# Patient Record
Sex: FEMALE | Race: WHITE | NOT HISPANIC OR LATINO | Employment: UNEMPLOYED | URBAN - METROPOLITAN AREA
[De-identification: names, ages, dates, MRNs, and addresses within clinical notes are randomized per-mention and may not be internally consistent; named-entity substitution may affect disease eponyms.]

---

## 2017-02-06 ENCOUNTER — EPISODE CHANGES (OUTPATIENT)
Dept: HEPATOLOGY | Facility: CLINIC | Age: 22
End: 2017-02-06

## 2017-02-09 ENCOUNTER — TELEPHONE (OUTPATIENT)
Dept: HEPATOLOGY | Facility: CLINIC | Age: 22
End: 2017-02-09

## 2017-02-09 ENCOUNTER — EPISODE CHANGES (OUTPATIENT)
Dept: HEPATOLOGY | Facility: CLINIC | Age: 22
End: 2017-02-09

## 2017-02-09 NOTE — TELEPHONE ENCOUNTER
Pt was due to have labs drawn on 2/6/17. Pt No Show. MA called pt today (2/9/17) to reschedule miss lab appt. Call successful. Spoke with DayanGaby.  informed staff that pt left her home and didn't leave contact information.

## 2017-06-28 ENCOUNTER — OFFICE VISIT (OUTPATIENT)
Dept: FAMILY MEDICINE | Facility: CLINIC | Age: 22
End: 2017-06-28
Payer: MEDICAID

## 2017-06-28 VITALS
RESPIRATION RATE: 16 BRPM | DIASTOLIC BLOOD PRESSURE: 70 MMHG | HEART RATE: 94 BPM | HEIGHT: 60 IN | TEMPERATURE: 98 F | SYSTOLIC BLOOD PRESSURE: 120 MMHG | OXYGEN SATURATION: 99 % | BODY MASS INDEX: 25.84 KG/M2 | WEIGHT: 131.63 LBS

## 2017-06-28 DIAGNOSIS — F41.9 ANXIETY: ICD-10-CM

## 2017-06-28 DIAGNOSIS — F90.9 ATTENTION DEFICIT HYPERACTIVITY DISORDER (ADHD), UNSPECIFIED ADHD TYPE: Primary | ICD-10-CM

## 2017-06-28 DIAGNOSIS — T78.2XXS ANAPHYLAXIS, SEQUELA: ICD-10-CM

## 2017-06-28 DIAGNOSIS — F90.9 ATTENTION DEFICIT HYPERACTIVITY DISORDER (ADHD), UNSPECIFIED ADHD TYPE: ICD-10-CM

## 2017-06-28 DIAGNOSIS — J30.9 ALLERGIC RHINITIS, UNSPECIFIED ALLERGIC RHINITIS TRIGGER, UNSPECIFIED RHINITIS SEASONALITY: ICD-10-CM

## 2017-06-28 DIAGNOSIS — M54.50 ACUTE BILATERAL LOW BACK PAIN WITHOUT SCIATICA: ICD-10-CM

## 2017-06-28 PROCEDURE — 99214 OFFICE O/P EST MOD 30 MIN: CPT | Mod: PBBFAC,PO | Performed by: PHYSICIAN ASSISTANT

## 2017-06-28 PROCEDURE — 99214 OFFICE O/P EST MOD 30 MIN: CPT | Mod: S$PBB,,, | Performed by: PHYSICIAN ASSISTANT

## 2017-06-28 PROCEDURE — 99999 PR PBB SHADOW E&M-EST. PATIENT-LVL IV: CPT | Mod: PBBFAC,,, | Performed by: PHYSICIAN ASSISTANT

## 2017-06-28 RX ORDER — FLUTICASONE PROPIONATE 50 MCG
2 SPRAY, SUSPENSION (ML) NASAL DAILY
Qty: 1 BOTTLE | Refills: 1 | Status: CANCELLED | OUTPATIENT
Start: 2017-06-28

## 2017-06-28 RX ORDER — EPINEPHRINE 0.3 MG/.3ML
1 INJECTION SUBCUTANEOUS ONCE
Qty: 0.3 ML | Refills: 1 | Status: CANCELLED | OUTPATIENT
Start: 2017-06-28 | End: 2017-06-28

## 2017-06-28 RX ORDER — EPINEPHRINE 0.3 MG/.3ML
1 INJECTION SUBCUTANEOUS ONCE
Qty: 2 EACH | Refills: 1 | Status: SHIPPED | OUTPATIENT
Start: 2017-06-28 | End: 2017-11-20 | Stop reason: SDUPTHER

## 2017-06-28 RX ORDER — FLUTICASONE PROPIONATE 50 MCG
2 SPRAY, SUSPENSION (ML) NASAL DAILY
Qty: 1 BOTTLE | Refills: 1 | Status: SHIPPED | OUTPATIENT
Start: 2017-06-28

## 2017-06-28 RX ORDER — BACLOFEN 10 MG/1
10 TABLET ORAL NIGHTLY PRN
Qty: 30 TABLET | Refills: 0 | Status: SHIPPED | OUTPATIENT
Start: 2017-06-28 | End: 2017-11-20

## 2017-06-28 NOTE — TELEPHONE ENCOUNTER
Saw her today and put in referral to see dragan. Wants to know if you will fill a month. Has not seen you seen sept but she was a reschedule today

## 2017-06-28 NOTE — PROGRESS NOTES
"Subjective:       Patient ID: Rigo Chi is a 22 y.o. female with multiple medical diagnoses as listed in the medical history and problem list that presents for Other (mental health referral)  .    Chief Complaint: Other (mental health referral)      Back Pain   This is a new problem. The current episode started today (feel this am when she was mopping --tripped ). The problem has been waxing and waning since onset. The pain is present in the gluteal. Quality: throbbing--pulsating  Radiates to: "all the way down to her feet" intermittently  Exacerbated by: walking  Associated symptoms include tingling. Pertinent negatives include no bladder incontinence, bowel incontinence, fever, numbness, paresis, paresthesias, perianal numbness or weakness. Treatments tried: tylenol and aleve around 1145. The treatment provided mild relief.     Pt wanting to get back on her ADHD medication.     Review of Systems   Constitutional: Negative for chills, fatigue and fever.   HENT: Negative for congestion.    Eyes: Negative for pain and discharge.   Respiratory: Negative for chest tightness and shortness of breath.    Gastrointestinal: Negative for bowel incontinence.   Genitourinary: Negative for bladder incontinence.   Musculoskeletal: Positive for back pain and neck pain. Negative for neck stiffness.   Neurological: Positive for tingling. Negative for facial asymmetry, weakness, numbness and paresthesias.   Psychiatric/Behavioral: Positive for decreased concentration. The patient is nervous/anxious.          PAST MEDICAL HISTORY:  Past Medical History:   Diagnosis Date    Allergy     History of hepatitis C: S/p RX with SVR 12 documented in 12/2016 12/5/2016       SOCIAL HISTORY:  Social History     Social History    Marital status: Single     Spouse name: N/A    Number of children: N/A    Years of education: N/A     Occupational History    Not on file.     Social History Main Topics    Smoking status: Current Every " Day Smoker    Smokeless tobacco: Never Used    Alcohol use No    Drug use: No    Sexual activity: Yes     Partners: Male     Birth control/ protection: None     Other Topics Concern    Not on file     Social History Narrative    No narrative on file       ALLERGIES AND MEDICATIONS: updated and reviewed.  Review of patient's allergies indicates:  No Known Allergies  Current Outpatient Prescriptions   Medication Sig Dispense Refill    epinephrine (EPIPEN 2-ANUEL) 0.3 mg/0.3 mL AtIn Inject 0.3 mLs (0.3 mg total) into the muscle once. 2 each 1    etonogestrel (NEXPLANON) 68 mg Impl by Subdermal route.      fluticasone (FLONASE) 50 mcg/actuation nasal spray 2 sprays by Each Nare route once daily. 1 Bottle 1    baclofen (LIORESAL) 10 MG tablet Take 1 tablet (10 mg total) by mouth nightly as needed. 30 tablet 0    dextroamphetamine-amphetamine 10 mg Tab Take 10 mg by mouth 2 (two) times daily. 60 tablet 0     No current facility-administered medications for this visit.          Objective:   /70   Pulse 94   Temp 98.4 °F (36.9 °C) (Oral)   Resp 16   Ht 5' (1.524 m)   Wt 59.7 kg (131 lb 9.8 oz)   LMP 06/25/2017   SpO2 99%   BMI 25.70 kg/m²      Physical Exam   Constitutional: She is oriented to person, place, and time. No distress.   HENT:   Head: Normocephalic and atraumatic.   Cardiovascular: Normal rate and regular rhythm.    Pulmonary/Chest: Effort normal and breath sounds normal.   Musculoskeletal:        Lumbar back: She exhibits tenderness and pain. She exhibits normal range of motion, no bony tenderness and no spasm.   Neurological: She is oriented to person, place, and time.   Skin: No erythema.   Psychiatric: Her speech is rapid and/or pressured.           Assessment:       1. Attention deficit hyperactivity disorder (ADHD), unspecified ADHD type    2. Anaphylaxis, sequela    3. Allergic rhinitis, unspecified allergic rhinitis trigger, unspecified rhinitis seasonality    4. Anxiety    5.  Acute bilateral low back pain without sciatica        Plan:       Attention deficit hyperactivity disorder (ADHD), unspecified ADHD type  -     Ambulatory referral to Psychiatry  Spoke to desk at Dr. Adriana Guzman and put in referral. Spoke with andreas who states she will give it to elbert and they will call her to so she can  referral in hand. Lady at office states pt can walk in with referral in hand any day 8-2:30 and be seen.     Anaphylaxis, sequela  -     epinephrine (EPIPEN 2-ANUEL) 0.3 mg/0.3 mL AtIn; Inject 0.3 mLs (0.3 mg total) into the muscle once.  Dispense: 2 each; Refill: 1    Allergic rhinitis, unspecified allergic rhinitis trigger, unspecified rhinitis seasonality  -     fluticasone (FLONASE) 50 mcg/actuation nasal spray; 2 sprays by Each Nare route once daily.  Dispense: 1 Bottle; Refill: 1    Anxiety  -     Ambulatory referral to Psychiatry    Acute bilateral low back pain without sciatica   NSAIDs  Ice  Rest  Baclofen at night           No Follow-up on file.

## 2017-06-28 NOTE — TELEPHONE ENCOUNTER
----- Message from Kalyn Hamilton sent at 6/28/2017  3:00 PM CDT -----  Contact: self   Pt would like medication to be sent to the pharmacy below.         [facility] [address], [place] [address] ·

## 2017-06-29 NOTE — TELEPHONE ENCOUNTER
Patient calling for  Refill of Adderall ; informed her the request will be sent to the doctor. LOV for Adderall 09/30/2016.

## 2017-06-29 NOTE — TELEPHONE ENCOUNTER
She can not have the medication, inconsistent follow up, will need a specialist to continue schedule II medication.

## 2017-06-29 NOTE — TELEPHONE ENCOUNTER
----- Message from Dafne Manriquez sent at 6/29/2017  9:57 AM CDT -----  Contact:  [phone]   Cynthia   Requesting a refill on adderall. Pls call Cynthia [phone]. Thanks.......Brenda

## 2017-07-02 RX ORDER — DEXTROAMPHETAMINE SACCHARATE, AMPHETAMINE ASPARTATE, DEXTROAMPHETAMINE SULFATE AND AMPHETAMINE SULFATE 2.5; 2.5; 2.5; 2.5 MG/1; MG/1; MG/1; MG/1
10 TABLET ORAL 2 TIMES DAILY
Qty: 60 TABLET | Refills: 0 | OUTPATIENT
Start: 2017-07-02

## 2017-07-06 ENCOUNTER — TELEPHONE (OUTPATIENT)
Dept: FAMILY MEDICINE | Facility: CLINIC | Age: 22
End: 2017-07-06

## 2017-07-06 DIAGNOSIS — F90.9 ATTENTION DEFICIT HYPERACTIVITY DISORDER (ADHD), UNSPECIFIED ADHD TYPE: ICD-10-CM

## 2017-07-06 DIAGNOSIS — F41.9 ANXIETY: Primary | ICD-10-CM

## 2017-07-06 NOTE — TELEPHONE ENCOUNTER
----- Message from Neetu Archer sent at 7/6/2017 10:37 AM CDT -----  Contact: Self/[phone]  Patient would like to speak to the staff regarding a Psychiatry referral. She would like the referral faxed to [phone].  Thank you.

## 2017-07-20 NOTE — TELEPHONE ENCOUNTER
Tried calling patient again, unable to leave voicemail. The referral has been authorized. I'm trying to have the patient come  the referral. Will try contacting her again.

## 2017-11-20 ENCOUNTER — OFFICE VISIT (OUTPATIENT)
Dept: FAMILY MEDICINE | Facility: CLINIC | Age: 22
End: 2017-11-20
Payer: MEDICAID

## 2017-11-20 VITALS
RESPIRATION RATE: 16 BRPM | BODY MASS INDEX: 26.4 KG/M2 | OXYGEN SATURATION: 99 % | HEIGHT: 59 IN | TEMPERATURE: 98 F | HEART RATE: 93 BPM | WEIGHT: 130.94 LBS

## 2017-11-20 DIAGNOSIS — R29.6 FALLS FREQUENTLY: ICD-10-CM

## 2017-11-20 DIAGNOSIS — R51.9 CHRONIC NONINTRACTABLE HEADACHE, UNSPECIFIED HEADACHE TYPE: ICD-10-CM

## 2017-11-20 DIAGNOSIS — T78.2XXS ANAPHYLAXIS, SEQUELA: ICD-10-CM

## 2017-11-20 DIAGNOSIS — F90.9 ATTENTION DEFICIT HYPERACTIVITY DISORDER (ADHD), UNSPECIFIED ADHD TYPE: Primary | ICD-10-CM

## 2017-11-20 DIAGNOSIS — G89.29 CHRONIC NONINTRACTABLE HEADACHE, UNSPECIFIED HEADACHE TYPE: ICD-10-CM

## 2017-11-20 PROCEDURE — 99999 PR PBB SHADOW E&M-EST. PATIENT-LVL III: CPT | Mod: PBBFAC,,, | Performed by: FAMILY MEDICINE

## 2017-11-20 PROCEDURE — 99213 OFFICE O/P EST LOW 20 MIN: CPT | Mod: PBBFAC,PO | Performed by: FAMILY MEDICINE

## 2017-11-20 PROCEDURE — 99395 PREV VISIT EST AGE 18-39: CPT | Mod: S$PBB,,, | Performed by: FAMILY MEDICINE

## 2017-11-20 RX ORDER — AMOXICILLIN 500 MG/1
500 CAPSULE ORAL EVERY 8 HOURS
COMMUNITY
End: 2018-02-01

## 2017-11-20 RX ORDER — EPINEPHRINE 0.3 MG/.3ML
1 INJECTION SUBCUTANEOUS ONCE
Qty: 2 EACH | Refills: 1 | Status: SHIPPED | OUTPATIENT
Start: 2017-11-20 | End: 2018-10-16 | Stop reason: SDUPTHER

## 2017-11-20 NOTE — PROGRESS NOTES
Chief Complaint   Patient presents with    [facility] referral       SUBJECTIVE:  Rigo Chi is a 22 y.o. female here for new problem of annual and wants to see psychiatry, won't divulge much and she has some headaches and wants to see neurology for headaches but again won't divulge much information.  Currently has co-morbidities including per problem list.      Past Medical History:   Diagnosis Date    Allergy     History of hepatitis C: S/p RX with SVR 12 documented in 2016     Past Surgical History:   Procedure Laterality Date     SECTION       Social History     Social History    Marital status: Single     Spouse name: N/A    Number of children: N/A    Years of education: N/A     Occupational History    Not on file.     Social History Main Topics    Smoking status: Current Every Day Smoker    Smokeless tobacco: Never Used    Alcohol use No    Drug use: No    Sexual activity: Yes     Partners: Male     Birth control/ protection: None     Other Topics Concern    Not on file     Social History Narrative    No narrative on file     Family History   Problem Relation Age of Onset    Mental illness Son      autism    Breast cancer Maternal Grandmother     Colon cancer Neg Hx     Ovarian cancer Neg Hx      Current Outpatient Prescriptions on File Prior to Visit   Medication Sig Dispense Refill    etonogestrel (NEXPLANON) 68 mg Impl by Subdermal route.      fluticasone (FLONASE) 50 mcg/actuation nasal spray 2 sprays by Each Nare route once daily. 1 Bottle 1    [DISCONTINUED] baclofen (LIORESAL) 10 MG tablet Take 1 tablet (10 mg total) by mouth nightly as needed. 30 tablet 0    [DISCONTINUED] dextroamphetamine-amphetamine 10 mg Tab Take 10 mg by mouth 2 (two) times daily. 60 tablet 0    [DISCONTINUED] epinephrine (EPIPEN 2-ANUEL) 0.3 mg/0.3 mL AtIn Inject 0.3 mLs (0.3 mg total) into the muscle once. 2 each 1     No current facility-administered medications on file prior to  "visit.      Review of patient's allergies indicates:  No Known Allergies      ROS    OBJECTIVE:  Pulse 93   Temp 97.9 °F (36.6 °C) (Oral)   Resp 16   Ht 4' 11" (1.499 m)   Wt 59.4 kg (130 lb 15.3 oz)   LMP 11/01/2017 (Approximate)   SpO2 99%   BMI 26.45 kg/m²     Wt Readings from Last 3 Encounters:   11/20/17 59.4 kg (130 lb 15.3 oz)   06/28/17 59.7 kg (131 lb 9.8 oz)   12/14/16 52.9 kg (116 lb 10 oz)     BP Readings from Last 3 Encounters:   06/28/17 120/70   12/14/16 114/80   09/30/16 118/70       She appears well, in no apparent distress.  Alert and oriented times three, pleasant and cooperative. Vital signs are as documented in vital signs section.  Poor eye contact   Speech is abnormal, doesn't appear intoxicating    Review of old Records:  Per problem list    Review of old labs:  No results found for: TSH  Lab Results   Component Value Date    WBC 6.05 04/29/2016    HGB 13.9 04/29/2016    HCT 40.9 04/29/2016    MCV 90 04/29/2016     04/29/2016       Chemistry        Component Value Date/Time     08/22/2016 1439    K 3.8 08/22/2016 1439     08/22/2016 1439    CO2 27 08/22/2016 1439    BUN 11 08/22/2016 1439    CREATININE 0.9 08/22/2016 1439     (H) 08/22/2016 1439        Component Value Date/Time    CALCIUM 9.5 08/22/2016 1439    ALKPHOS 98 08/22/2016 1439    AST 11 08/22/2016 1439    ALT 10 08/22/2016 1439    BILITOT 0.7 08/22/2016 1439    ESTGFRAFRICA >60 08/22/2016 1439    EGFRNONAA >60 08/22/2016 1439        Lab Results   Component Value Date    CHOL 110 (L) 04/02/2015     Lab Results   Component Value Date    HDL 44 04/02/2015     Lab Results   Component Value Date    LDLCALC 56.0 (L) 04/02/2015     Lab Results   Component Value Date    TRIG 50 04/02/2015     Lab Results   Component Value Date    CHOLHDL 40.0 04/02/2015         Review of old imaging:  n/a    ASSESSMENT:  Problem List Items Addressed This Visit     ADHD (attention deficit hyperactivity disorder) - Primary    " Relevant Orders    Ambulatory referral to Psychiatry      Other Visit Diagnoses     Anaphylaxis, sequela        Relevant Medications    EPINEPHrine (EPIPEN 2-ANUEL) 0.3 mg/0.3 mL AtIn    Chronic nonintractable headache, unspecified headache type        Relevant Orders    Ambulatory referral to Neurology    Falls frequently        Relevant Orders    Ambulatory referral to Neurology          ICD-10-CM ICD-9-CM   1. Attention deficit hyperactivity disorder (ADHD), unspecified ADHD type F90.9 314.01   2. Anaphylaxis, sequela T78.2XXS 909.9   3. Chronic nonintractable headache, unspecified headache type R51 784.0   4. Falls frequently R29.6 V15.88         PLAN:  1. Anaphylaxis, sequela  The current medical regimen is effective;  continue present plan and medications.    - EPINEPHrine (EPIPEN 2-ANUEL) 0.3 mg/0.3 mL AtIn; Inject 0.3 mLs (0.3 mg total) into the muscle once.  Dispense: 2 each; Refill: 1    2. Attention deficit hyperactivity disorder (ADHD), unspecified ADHD type  send  - Ambulatory referral to Psychiatry    3. Chronic nonintractable headache, unspecified headache type  Please send a another dose/package as soon as possible.    - Ambulatory referral to Neurology    4. Falls frequently  unclear  - Ambulatory referral to Neurology    She didn't give us much, maybe she will talk more to the specialist  Medication List with Changes/Refills   Current Medications    AMOXICILLIN (AMOXIL) 500 MG CAPSULE    Take 500 mg by mouth every 8 (eight) hours.    ETONOGESTREL (NEXPLANON) 68 MG IMPL    by Subdermal route.    FLUTICASONE (FLONASE) 50 MCG/ACTUATION NASAL SPRAY    2 sprays by Each Nare route once daily.   Changed and/or Refilled Medications    Modified Medication Previous Medication    EPINEPHRINE (EPIPEN 2-ANUEL) 0.3 MG/0.3 ML ATIN epinephrine (EPIPEN 2-ANUEL) 0.3 mg/0.3 mL AtIn       Inject 0.3 mLs (0.3 mg total) into the muscle once.    Inject 0.3 mLs (0.3 mg total) into the muscle once.   Discontinued Medications     BACLOFEN (LIORESAL) 10 MG TABLET    Take 1 tablet (10 mg total) by mouth nightly as needed.    DEXTROAMPHETAMINE-AMPHETAMINE 10 MG TAB    Take 10 mg by mouth 2 (two) times daily.       No Follow-up on file.

## 2017-11-22 ENCOUNTER — TELEPHONE (OUTPATIENT)
Dept: FAMILY MEDICINE | Facility: CLINIC | Age: 22
End: 2017-11-22

## 2017-11-22 NOTE — TELEPHONE ENCOUNTER
----- Message from Reymundo Arndt MD sent at 11/20/2017 10:45 PM CST -----  1 year [facility]

## 2017-11-29 ENCOUNTER — TELEPHONE (OUTPATIENT)
Dept: FAMILY MEDICINE | Facility: CLINIC | Age: 22
End: 2017-11-29

## 2017-12-07 ENCOUNTER — TELEPHONE (OUTPATIENT)
Dept: FAMILY MEDICINE | Facility: CLINIC | Age: 22
End: 2017-12-07

## 2017-12-07 NOTE — TELEPHONE ENCOUNTER
Left message for patient to call office.  Chart note 11/29/2017 -Referral to Neurology was faxed to [facility].  Please advise patient.

## 2017-12-07 NOTE — TELEPHONE ENCOUNTER
----- Message from Tabitha Sevilla sent at 12/7/2017  9:26 AM CST -----  Contact: Self  Pt wants to know if she can get a referral to Neurology. Pt can be reached @ [phone].

## 2017-12-26 ENCOUNTER — LAB VISIT (OUTPATIENT)
Dept: LAB | Facility: HOSPITAL | Age: 22
End: 2017-12-26
Attending: FAMILY MEDICINE
Payer: MEDICAID

## 2017-12-26 DIAGNOSIS — Z00.00 ANNUAL PHYSICAL EXAM: Primary | ICD-10-CM

## 2017-12-26 DIAGNOSIS — Z00.00 ANNUAL PHYSICAL EXAM: ICD-10-CM

## 2017-12-26 LAB
ALBUMIN SERPL BCP-MCNC: 4.1 G/DL
ALP SERPL-CCNC: 88 U/L
ALT SERPL W/O P-5'-P-CCNC: 6 U/L
ANION GAP SERPL CALC-SCNC: 6 MMOL/L
AST SERPL-CCNC: 12 U/L
BASOPHILS # BLD AUTO: 0.05 K/UL
BASOPHILS NFR BLD: 0.6 %
BILIRUB SERPL-MCNC: 0.7 MG/DL
BUN SERPL-MCNC: 9 MG/DL
C TRACH DNA SPEC QL NAA+PROBE: NOT DETECTED
CALCIUM SERPL-MCNC: 9.6 MG/DL
CHLORIDE SERPL-SCNC: 104 MMOL/L
CHOLEST SERPL-MCNC: 143 MG/DL
CHOLEST/HDLC SERPL: 2.8 {RATIO}
CO2 SERPL-SCNC: 28 MMOL/L
CREAT SERPL-MCNC: 0.8 MG/DL
DIFFERENTIAL METHOD: NORMAL
EOSINOPHIL # BLD AUTO: 0.5 K/UL
EOSINOPHIL NFR BLD: 5.1 %
ERYTHROCYTE [DISTWIDTH] IN BLOOD BY AUTOMATED COUNT: 13.1 %
EST. GFR  (AFRICAN AMERICAN): >60 ML/MIN/1.73 M^2
EST. GFR  (NON AFRICAN AMERICAN): >60 ML/MIN/1.73 M^2
ESTIMATED AVG GLUCOSE: 91 MG/DL
GLUCOSE SERPL-MCNC: 125 MG/DL
HBA1C MFR BLD HPLC: 4.8 %
HCT VFR BLD AUTO: 41.7 %
HDLC SERPL-MCNC: 52 MG/DL
HDLC SERPL: 36.4 %
HGB BLD-MCNC: 13.9 G/DL
LDLC SERPL CALC-MCNC: 74.8 MG/DL
LYMPHOCYTES # BLD AUTO: 3.2 K/UL
LYMPHOCYTES NFR BLD: 35.8 %
MCH RBC QN AUTO: 30.1 PG
MCHC RBC AUTO-ENTMCNC: 33.3 G/DL
MCV RBC AUTO: 90 FL
MONOCYTES # BLD AUTO: 0.5 K/UL
MONOCYTES NFR BLD: 5.2 %
N GONORRHOEA DNA SPEC QL NAA+PROBE: NOT DETECTED
NEUTROPHILS # BLD AUTO: 4.8 K/UL
NEUTROPHILS NFR BLD: 53.3 %
NONHDLC SERPL-MCNC: 91 MG/DL
PLATELET # BLD AUTO: 324 K/UL
PMV BLD AUTO: 9.7 FL
POTASSIUM SERPL-SCNC: 4.2 MMOL/L
PROT SERPL-MCNC: 7 G/DL
RBC # BLD AUTO: 4.62 M/UL
SODIUM SERPL-SCNC: 138 MMOL/L
TRIGL SERPL-MCNC: 81 MG/DL
TSH SERPL DL<=0.005 MIU/L-ACNC: 0.46 UIU/ML
URATE SERPL-MCNC: 4.2 MG/DL
WBC # BLD AUTO: 8.9 K/UL

## 2017-12-26 PROCEDURE — 86703 HIV-1/HIV-2 1 RESULT ANTBDY: CPT

## 2017-12-26 PROCEDURE — 86592 SYPHILIS TEST NON-TREP QUAL: CPT

## 2017-12-26 PROCEDURE — 83036 HEMOGLOBIN GLYCOSYLATED A1C: CPT

## 2017-12-26 PROCEDURE — 86803 HEPATITIS C AB TEST: CPT

## 2017-12-26 PROCEDURE — 36415 COLL VENOUS BLD VENIPUNCTURE: CPT | Mod: PO

## 2017-12-26 PROCEDURE — 84550 ASSAY OF BLOOD/URIC ACID: CPT

## 2017-12-26 PROCEDURE — 80053 COMPREHEN METABOLIC PANEL: CPT

## 2017-12-26 PROCEDURE — 85025 COMPLETE CBC W/AUTO DIFF WBC: CPT

## 2017-12-26 PROCEDURE — 87591 N.GONORRHOEAE DNA AMP PROB: CPT

## 2017-12-26 PROCEDURE — 80061 LIPID PANEL: CPT

## 2017-12-26 PROCEDURE — 84443 ASSAY THYROID STIM HORMONE: CPT

## 2017-12-27 LAB
HCV AB SERPL QL IA: POSITIVE
HIV 1+2 AB+HIV1 P24 AG SERPL QL IA: NEGATIVE
RPR SER QL: NORMAL

## 2018-02-01 ENCOUNTER — OFFICE VISIT (OUTPATIENT)
Dept: FAMILY MEDICINE | Facility: CLINIC | Age: 23
End: 2018-02-01
Payer: MEDICAID

## 2018-02-01 VITALS
WEIGHT: 133.38 LBS | TEMPERATURE: 99 F | OXYGEN SATURATION: 98 % | HEIGHT: 59 IN | DIASTOLIC BLOOD PRESSURE: 60 MMHG | BODY MASS INDEX: 26.89 KG/M2 | RESPIRATION RATE: 16 BRPM | HEART RATE: 93 BPM | SYSTOLIC BLOOD PRESSURE: 110 MMHG

## 2018-02-01 DIAGNOSIS — Z86.19 HISTORY OF HEPATITIS C: ICD-10-CM

## 2018-02-01 DIAGNOSIS — J30.89 CHRONIC NONSEASONAL ALLERGIC RHINITIS DUE TO POLLEN: Primary | ICD-10-CM

## 2018-02-01 DIAGNOSIS — Z12.4 SCREENING FOR CERVICAL CANCER: ICD-10-CM

## 2018-02-01 PROCEDURE — 99214 OFFICE O/P EST MOD 30 MIN: CPT | Mod: PBBFAC,PO | Performed by: FAMILY MEDICINE

## 2018-02-01 PROCEDURE — 99214 OFFICE O/P EST MOD 30 MIN: CPT | Mod: S$PBB,,, | Performed by: FAMILY MEDICINE

## 2018-02-01 PROCEDURE — 99999 PR PBB SHADOW E&M-EST. PATIENT-LVL IV: CPT | Mod: PBBFAC,,, | Performed by: FAMILY MEDICINE

## 2018-02-01 PROCEDURE — 3008F BODY MASS INDEX DOCD: CPT | Mod: ,,, | Performed by: FAMILY MEDICINE

## 2018-02-01 RX ORDER — AZELASTINE 1 MG/ML
1 SPRAY, METERED NASAL 2 TIMES DAILY
Qty: 30 ML | Refills: 0 | Status: SHIPPED | OUTPATIENT
Start: 2018-02-01 | End: 2019-02-01

## 2018-02-01 RX ORDER — MIRTAZAPINE 15 MG/1
1 TABLET, FILM COATED ORAL NIGHTLY PRN
COMMUNITY
Start: 2018-01-29

## 2018-02-01 RX ORDER — DEXTROAMPHETAMINE SACCHARATE, AMPHETAMINE ASPARTATE, DEXTROAMPHETAMINE SULFATE AND AMPHETAMINE SULFATE 2.5; 2.5; 2.5; 2.5 MG/1; MG/1; MG/1; MG/1
1 TABLET ORAL DAILY
COMMUNITY
Start: 2018-01-04

## 2018-02-01 RX ORDER — CITALOPRAM 20 MG/1
1 TABLET, FILM COATED ORAL NIGHTLY
COMMUNITY
Start: 2018-01-29

## 2018-02-01 NOTE — PROGRESS NOTES
Chief Complaint   Patient presents with    Sore Throat       SUBJECTIVE:  Rigo Chi is a 23 y.o. female here for new problem of allergies with sore throat, OTC treatment not helpful and she is wondering if she is completed with her hep C treatment.  Currently has co-morbidities including per problem list.      Past Medical History:   Diagnosis Date    Allergy     History of hepatitis C: S/p RX with SVR 12 documented in 2016     Past Surgical History:   Procedure Laterality Date     SECTION       Social History     Social History    Marital status: Single     Spouse name: N/A    Number of children: N/A    Years of education: N/A     Occupational History    Not on file.     Social History Main Topics    Smoking status: Current Every Day Smoker    Smokeless tobacco: Never Used    Alcohol use No    Drug use: No    Sexual activity: Yes     Partners: Male     Birth control/ protection: None     Other Topics Concern    Not on file     Social History Narrative    2017    Living with a friend, cleaning with friends    Some legal troubles         Family History   Problem Relation Age of Onset    Mental illness Son      autism    Breast cancer Maternal Grandmother     Colon cancer Neg Hx     Ovarian cancer Neg Hx      Current Outpatient Prescriptions on File Prior to Visit   Medication Sig Dispense Refill    EPINEPHrine (EPIPEN 2-ANUEL) 0.3 mg/0.3 mL AtIn Inject 0.3 mLs (0.3 mg total) into the muscle once. 2 each 1    etonogestrel (NEXPLANON) 68 mg Impl by Subdermal route.      fluticasone (FLONASE) 50 mcg/actuation nasal spray 2 sprays by Each Nare route once daily. 1 Bottle 1    [DISCONTINUED] amoxicillin (AMOXIL) 500 MG capsule Take 500 mg by mouth every 8 (eight) hours.       No current facility-administered medications on file prior to visit.      Review of patient's allergies indicates:  No Known Allergies      ROS    OBJECTIVE:  /60   Pulse 93   Temp 98.7  "°F (37.1 °C) (Oral)   Resp 16   Ht 4' 11" (1.499 m)   Wt 60.5 kg (133 lb 6.1 oz)   LMP 2018 (Approximate)   SpO2 98%   Breastfeeding? No   BMI 26.94 kg/m²     Wt Readings from Last 3 Encounters:   18 60.5 kg (133 lb 6.1 oz)   17 59.4 kg (130 lb 15.3 oz)   17 59.7 kg (131 lb 9.8 oz)     BP Readings from Last 3 Encounters:   18 110/60   17 120/70   16 114/80       She appears well, in no apparent distress.  Alert and oriented times three, pleasant and cooperative. Vital signs are as documented in vital signs section.  Nose with coryza and throat with PND, no exudates.  S1 and S2 normal, no murmurs, clicks, gallops or rubs. Regular rate and rhythm. Chest is clear; no wheezes or rales. No edema or JVD.      Review of old Records:  Reviewed per epic  Reviewed last hepatology    Review of old labs:  Reviewed last hep C work up    Review of old imagin.  Abdominal ultrasound within normal limits.    ASSESSMENT:  Problem List Items Addressed This Visit     History of hepatitis C: S/p RX with SVR 12 documented in 2016    Relevant Orders    HEPATITIS C RNA, QUANTITATIVE, PCR    Ambulatory Referral to Hepatology    Allergic rhinitis - Primary    Relevant Medications    azelastine (ASTELIN) 137 mcg (0.1 %) nasal spray    Other Relevant Orders    Ambulatory referral to Allergy      Other Visit Diagnoses     Screening for cervical cancer        Relevant Orders    Ambulatory referral to Obstetrics / Gynecology          ICD-10-CM ICD-9-CM   1. Chronic nonseasonal allergic rhinitis due to pollen J30.89 477.0   2. History of hepatitis C: S/p RX with SVR 12 documented in 2016 Z86.19 V12.09   3. Screening for cervical cancer Z12.4 V76.2         PLAN:  1. History of hepatitis C: S/p RX with SVR 12 documented in 2016  See if hepatology needs to see her again  Explained screen will always be postiive likely for HEP c  - HEPATITIS C RNA, QUANTITATIVE, PCR; Future  - Ambulatory " Referral to Hepatology    2. Screening for cervical cancer  Get her screened  - Ambulatory referral to Obstetrics / Gynecology    3. Chronic nonseasonal allergic rhinitis due to pollen  Potential medication side effects were discussed with the patient; let me know if any occur.  Recurrent  She had issues when younger and ?immune shots.  Have her see allergy again for recommendations.  - azelastine (ASTELIN) 137 mcg (0.1 %) nasal spray; 1 spray (137 mcg total) by Nasal route 2 (two) times daily.  Dispense: 30 mL; Refill: 0  - Ambulatory referral to Allergy      Medication List with Changes/Refills   New Medications    AZELASTINE (ASTELIN) 137 MCG (0.1 %) NASAL SPRAY    1 spray (137 mcg total) by Nasal route 2 (two) times daily.   Current Medications    CITALOPRAM (CELEXA) 20 MG TABLET    Take 1 tablet by mouth every evening.     DEXTROAMPHETAMINE-AMPHETAMINE 10 MG TAB    Take 1 tablet by mouth once daily.    EPINEPHRINE (EPIPEN 2-ANUEL) 0.3 MG/0.3 ML ATIN    Inject 0.3 mLs (0.3 mg total) into the muscle once.    ETONOGESTREL (NEXPLANON) 68 MG IMPL    by Subdermal route.    FLUTICASONE (FLONASE) 50 MCG/ACTUATION NASAL SPRAY    2 sprays by Each Nare route once daily.    MIRTAZAPINE (REMERON) 15 MG TABLET    Take 1 tablet by mouth nightly as needed.   Discontinued Medications    AMOXICILLIN (AMOXIL) 500 MG CAPSULE    Take 500 mg by mouth every 8 (eight) hours.       No Follow-up on file.

## 2018-02-05 ENCOUNTER — TELEPHONE (OUTPATIENT)
Dept: ADMINISTRATIVE | Facility: HOSPITAL | Age: 23
End: 2018-02-05

## 2018-02-06 ENCOUNTER — TELEPHONE (OUTPATIENT)
Dept: ADMINISTRATIVE | Facility: HOSPITAL | Age: 23
End: 2018-02-06

## 2018-02-15 ENCOUNTER — TELEPHONE (OUTPATIENT)
Dept: HEPATOLOGY | Facility: CLINIC | Age: 23
End: 2018-02-15

## 2018-02-15 ENCOUNTER — TELEPHONE (OUTPATIENT)
Dept: FAMILY MEDICINE | Facility: CLINIC | Age: 23
End: 2018-02-15

## 2018-02-15 NOTE — TELEPHONE ENCOUNTER
----- Message from Maxine Guillen sent at 2/15/2018  9:50 AM CST -----  Contact: self  Pt is asking for referral to orthopedics or a bone density screening. She states every time she gets sick her bones get cold and when she moves she can hear them cracking.  Please call pt back at [phone].

## 2018-02-16 NOTE — TELEPHONE ENCOUNTER
Yes the positive I explained to her, it was more an educational thing for me and her (she didn't know if she followed up).  The biggest thing is that she functions mentally as a much younger person unfortunately and needed lots of reassurance but this is perfect Federico!  I can let her know definitively.     Reymundo

## 2018-06-22 ENCOUNTER — OFFICE VISIT (OUTPATIENT)
Dept: ALLERGY | Facility: CLINIC | Age: 23
End: 2018-06-22
Payer: MEDICAID

## 2018-06-22 ENCOUNTER — LAB VISIT (OUTPATIENT)
Dept: LAB | Facility: HOSPITAL | Age: 23
End: 2018-06-22
Attending: STUDENT IN AN ORGANIZED HEALTH CARE EDUCATION/TRAINING PROGRAM
Payer: MEDICAID

## 2018-06-22 VITALS
SYSTOLIC BLOOD PRESSURE: 100 MMHG | BODY MASS INDEX: 25.46 KG/M2 | DIASTOLIC BLOOD PRESSURE: 56 MMHG | HEIGHT: 59 IN | WEIGHT: 126.31 LBS

## 2018-06-22 DIAGNOSIS — Z86.19 FREQUENT INFECTIONS: ICD-10-CM

## 2018-06-22 DIAGNOSIS — J31.0 RHINITIS, UNSPECIFIED TYPE: Primary | ICD-10-CM

## 2018-06-22 DIAGNOSIS — Z91.018 H/O FOOD ALLERGY: ICD-10-CM

## 2018-06-22 DIAGNOSIS — J31.0 RHINITIS, UNSPECIFIED TYPE: ICD-10-CM

## 2018-06-22 LAB
BASOPHILS # BLD AUTO: 0.06 K/UL
BASOPHILS NFR BLD: 0.9 %
DIFFERENTIAL METHOD: NORMAL
EOSINOPHIL # BLD AUTO: 0.3 K/UL
EOSINOPHIL NFR BLD: 3.8 %
ERYTHROCYTE [DISTWIDTH] IN BLOOD BY AUTOMATED COUNT: 13.1 %
HCT VFR BLD AUTO: 41.7 %
HGB BLD-MCNC: 13.5 G/DL
IGA SERPL-MCNC: 107 MG/DL
IGE SERPL-ACNC: 293 IU/ML
IGG SERPL-MCNC: 630 MG/DL
IGM SERPL-MCNC: 78 MG/DL
IMM GRANULOCYTES # BLD AUTO: 0.02 K/UL
IMM GRANULOCYTES NFR BLD AUTO: 0.3 %
LYMPHOCYTES # BLD AUTO: 3.2 K/UL
LYMPHOCYTES NFR BLD: 46.5 %
MCH RBC QN AUTO: 29.8 PG
MCHC RBC AUTO-ENTMCNC: 32.4 G/DL
MCV RBC AUTO: 92 FL
MONOCYTES # BLD AUTO: 0.5 K/UL
MONOCYTES NFR BLD: 8 %
NEUTROPHILS # BLD AUTO: 2.8 K/UL
NEUTROPHILS NFR BLD: 40.5 %
NRBC BLD-RTO: 0 /100 WBC
PLATELET # BLD AUTO: 304 K/UL
PMV BLD AUTO: 9.7 FL
RBC # BLD AUTO: 4.53 M/UL
WBC # BLD AUTO: 6.78 K/UL

## 2018-06-22 PROCEDURE — 99999 PR PBB SHADOW E&M-EST. PATIENT-LVL III: CPT | Mod: PBBFAC,,, | Performed by: STUDENT IN AN ORGANIZED HEALTH CARE EDUCATION/TRAINING PROGRAM

## 2018-06-22 PROCEDURE — 82785 ASSAY OF IGE: CPT

## 2018-06-22 PROCEDURE — 82784 ASSAY IGA/IGD/IGG/IGM EACH: CPT | Mod: 59

## 2018-06-22 PROCEDURE — 99213 OFFICE O/P EST LOW 20 MIN: CPT | Mod: PBBFAC,PO | Performed by: STUDENT IN AN ORGANIZED HEALTH CARE EDUCATION/TRAINING PROGRAM

## 2018-06-22 PROCEDURE — 86003 ALLG SPEC IGE CRUDE XTRC EA: CPT

## 2018-06-22 PROCEDURE — 86317 IMMUNOASSAY INFECTIOUS AGENT: CPT

## 2018-06-22 PROCEDURE — 86317 IMMUNOASSAY INFECTIOUS AGENT: CPT | Mod: 59

## 2018-06-22 PROCEDURE — 99203 OFFICE O/P NEW LOW 30 MIN: CPT | Mod: S$PBB,,, | Performed by: STUDENT IN AN ORGANIZED HEALTH CARE EDUCATION/TRAINING PROGRAM

## 2018-06-22 PROCEDURE — 86684 HEMOPHILUS INFLUENZA ANTIBDY: CPT

## 2018-06-22 PROCEDURE — 86774 TETANUS ANTIBODY: CPT

## 2018-06-22 PROCEDURE — 82784 ASSAY IGA/IGD/IGG/IGM EACH: CPT

## 2018-06-22 PROCEDURE — 86003 ALLG SPEC IGE CRUDE XTRC EA: CPT | Mod: 59

## 2018-06-22 PROCEDURE — 85025 COMPLETE CBC W/AUTO DIFF WBC: CPT

## 2018-06-22 RX ORDER — CETIRIZINE HYDROCHLORIDE 10 MG/1
10 TABLET ORAL DAILY
Refills: 0 | COMMUNITY
Start: 2018-05-29 | End: 2022-06-11

## 2018-06-22 RX ORDER — MUPIROCIN 20 MG/G
OINTMENT TOPICAL
Refills: 0 | COMMUNITY
Start: 2018-05-29 | End: 2022-06-11

## 2018-06-22 NOTE — PATIENT INSTRUCTIONS
Testing  Blood work for allergy testing today       Check portal in one week for results or call [phone]       Contact me with questions or concerns       I will contact you if anything needs immediate attention.        Treatment  Flonase (= fluticasone) nasal spray:  2 squirts each nostril once a day   Increase to twice a day at first sign of infection   Remember to aim out toward your ear.   Needs to be used regularly 5-14 days for full effect.      Return in 2 weeks  If you want to be skin tested, you need to be off Zyrtec (and all antihistamines) for 7 days before.

## 2018-06-22 NOTE — LETTER
June 22, 2018      Reymundo Arndt MD  [address]  [place] [address]           [facility]  [address]  [place] [address]  Phone: [phone]          Patient: Rigo Chi   MR Number: [id number]   YOB: 1995   Date of Visit: 6/22/2018       Dear Dr. Reymundo Arndt:    Thank you for referring Rigo Chi to me for evaluation. Attached you will find relevant portions of my assessment and plan of care.    If you have questions, please do not hesitate to call me. I look forward to following Rigo Chi along with you.    Sincerely,    Hortensia Staley MD    Enclosure  CC:  No Recipients    If you would like to receive this communication electronically, please contact [email or web address] or [phone] to request more information on [facility] access.    For providers and/or their staff who would like to refer a patient to [facility], please contact us through our one-stop-shop provider referral line, [facility], at [phone].    If you feel you have received this communication in error or would no longer like to receive these types of communications, please e-mail [email or web address]

## 2018-06-22 NOTE — PROGRESS NOTES
Allergy Clinic Note  [facility]    Subjective:      Patient ID: Rigo Chi is a 23 y.o. female.    Chief Complaint: Itching and Nasal Congestion      Referring Provider: Reymundo Arndt    History of Present Illness:  23-year-old female with a history of allergic rhinitis as a child presents for continuing care.  She is here by herself and is a poor historian.    She reports runny nose, nasal congestion and itching on a regular basis.  She takes Zyrtec and Flonase on a daily basis.  She also reports frequent otitis despite regular use of her nose spray.  She says she has decreased hearing in her right ear because of her infections.  There are no clear triggers.  She reports being skin test positive to yeast, mold, and dust mites in the past as well as tomato.  It is not clear if the tomato caused any symptoms.    Recently she reports an episode of red itchy rash on her arms that she attributes to Lysol exposure.  She says that 4 days later she experienced throat constriction requiring a trip to the emergency room.  She believes the 2 reactions were related.    She also reports being told when she was younger that she had a problem with her immune system.  She does not know any details.  No records are available.  Other than the recurrent ear infections, she denies other sinopulmonary infections or any severe/unusual infections,        Additional History:  Past medical history is significant for  ADHD.  No Hx of ENT surgery. Family history is significant for allergies in her father, mother, sisters, and son.  Her son also has autism spectrum disorder.Client  reports that she has been smoking Cigarettes and Vaping with nicotine.  She has never used smokeless tobacco.  Exposures are notable for smoke exposure.  Denies exposure to pets or mold.       Patient Active Problem List   Diagnosis    Tobacco abuse    ADHD (attention deficit hyperactivity disorder)    Allergic rhinitis    Eczema     "Sebaceous cyst, right ear    History of hepatitis C: S/p RX with SVR 12 documented in 12/2016    Generalized abdominal pain    Constipation    Family history of colon cancer     Current Outpatient Prescriptions on File Prior to Visit   Medication Sig Dispense Refill    azelastine (ASTELIN) 137 mcg (0.1 %) nasal spray 1 spray (137 mcg total) by Nasal route 2 (two) times daily. 30 mL 0    citalopram (CELEXA) 20 MG tablet Take 1 tablet by mouth every evening.       dextroamphetamine-amphetamine 10 mg Tab Take 1 tablet by mouth once daily.      etonogestrel (NEXPLANON) 68 mg Impl by Subdermal route.      fluticasone (FLONASE) 50 mcg/actuation nasal spray 2 sprays by Each Nare route once daily. 1 Bottle 1    mirtazapine (REMERON) 15 MG tablet Take 1 tablet by mouth nightly as needed.      EPINEPHrine (EPIPEN 2-ANUEL) 0.3 mg/0.3 mL AtIn Inject 0.3 mLs (0.3 mg total) into the muscle once. 2 each 1     No current facility-administered medications on file prior to visit.          Review of Systems   Constitutional: Negative for chills, fever and malaise/fatigue.   HENT: Positive for congestion. Negative for ear discharge.    Eyes: Negative for pain and discharge.   Respiratory: Negative for hemoptysis, shortness of breath, wheezing and stridor.    Cardiovascular: Negative for chest pain and palpitations.   Gastrointestinal: Negative for abdominal pain, blood in stool, nausea and vomiting.   Genitourinary: Negative for dysuria, flank pain and hematuria.   Musculoskeletal: Negative for joint pain and myalgias.   Skin: Negative for itching and rash.   Neurological: Negative for seizures and loss of consciousness.       Objective:   BP (!) 100/56 (BP Location: Right arm, Patient Position: Sitting, BP Method: Medium (Manual))   Ht 4' 11" (1.499 m)   Wt 57.3 kg (126 lb 5.2 oz)   BMI 25.51 kg/m²     Physical Exam   Constitutional: She is well-developed, well-nourished, and in no distress. No distress.   HENT:   Right " Ear: Tympanic membrane normal.   Left Ear: Tympanic membrane normal.   Nose: No nasal deformity. No epistaxis.  No foreign bodies.   Mouth/Throat: No uvula swelling. No oropharyngeal exudate or tonsillar abscesses.   Nares pink with mild to moderate turbinates swelling.   Eyes: Conjunctivae are normal. Right eye exhibits no discharge. Left eye exhibits no discharge.   Cardiovascular: Normal heart sounds.    Pulmonary/Chest: Breath sounds normal. No stridor. She has no wheezes. She has no rales.   Abdominal: She exhibits no distension.   Musculoskeletal: She exhibits no edema.   Lymphadenopathy:     She has cervical adenopathy.   Neurological: She is alert. Gait normal.   Skin: Skin is warm and dry. No rash noted.   Psychiatric:   Speech:  Normal rate, rhythm, and volume  Thought processes appear linear.       Data:  Eosinophilic count 500 on CBC from 12/26/2017.      Assessment:     1. Rhinitis, unspecified type    2. H/O food allergy    3. Frequent infections        Plan:     Rigo was seen today for itching and nasal congestion.    Diagnoses and all orders for this visit:    Rhinitis, unspecified type  -     IgE; Future  -     Dermatophagoides [place]; Future  -     Dermatophagoides Pteronyssinus; Future  -     [place]; Future  -     Karlo; Future  -     Anderson; Future  -     English Plantain; Future  -     Oak Pecan; Future  -     Pecan [place]; Future  -     Samuel Elder; Future  -     Ragweed; Future  -     Alternaria; Future  -     Aspergillus; Future  -     Cat; Future  -     Cockroach; Future  -     Dog; Future  -     RAST Allergen Candida albicans; Future    Unclear H/O food allergy  -     Tomato IgE; Future    Frequent infections, mainly otitis  -     Diphtheria / Tetanus Antibody Panel; Future  -     Haemophilius influenzae B Ab IgG; Future  -     IgA; Future  -     S.pneumoniae IgG Serotypes; Future  -     IgM; Future  -     IgG; Future  -     CBC auto differential; Future    Decreased hearing not  addressed.  Drug allergies not addressed.    Patient Instructions   Testing  Blood work for allergy testing today       Check portal in one week for results or call [phone]       Contact me with questions or concerns       I will contact you if anything needs immediate attention.        Treatment  Flonase (= fluticasone) nasal spray:  2 squirts each nostril once a day   Increase to twice a day at first sign of infection   Remember to aim out toward your ear.   Needs to be used regularly 5-14 days for full effect.      Return in 2 weeks  If you want to be skin tested, you need to be off Zyrtec (and all antihistamines) for 7 days before.      Follow-up in about 2 weeks (around 7/6/2018).    Hortensia Staley MD

## 2018-06-25 LAB
A ALTERNATA IGE QN: <0.35 KU/L
A FUMIGATUS IGE QN: 0.95 KU/L
BERMUDA GRASS IGE QN: <0.35 KU/L
CAT DANDER IGE QN: <0.35 KU/L
CEDAR IGE QN: <0.35 KU/L
D FARINAE IGE QN: 1.87 KU/L
D PTERONYSS IGE QN: 2.51 KU/L
DEPRECATED A ALTERNATA IGE RAST QL: NORMAL
DEPRECATED A FUMIGATUS IGE RAST QL: ABNORMAL
DEPRECATED BERMUDA GRASS IGE RAST QL: NORMAL
DEPRECATED CAT DANDER IGE RAST QL: NORMAL
DEPRECATED CEDAR IGE RAST QL: NORMAL
DEPRECATED D FARINAE IGE RAST QL: ABNORMAL
DEPRECATED D PTERONYSS IGE RAST QL: ABNORMAL
DEPRECATED DOG DANDER IGE RAST QL: NORMAL
DEPRECATED ENGL PLANTAIN IGE RAST QL: NORMAL
DEPRECATED MARSH ELDER IGE RAST QL: NORMAL
DEPRECATED PECAN/HICK TREE IGE RAST QL: NORMAL
DEPRECATED ROACH IGE RAST QL: NORMAL
DEPRECATED TIMOTHY IGE RAST QL: NORMAL
DEPRECATED TOMATO IGE RAST QL: NORMAL
DEPRECATED WHITE OAK IGE RAST QL: NORMAL
DIPHTHERIA IGG VALUE: 0.76 IU/ML
DIPHTHERIA TOXOID IGG ANTIBODY: POSITIVE
DOG DANDER IGE QN: <0.35 KU/L
ENGL PLANTAIN IGE QN: <0.35 KU/L
Lab: 2.92 KU/L
Lab: ABNORMAL
MARSH ELDER IGE QN: <0.35 KU/L
PECAN/HICK TREE IGE QN: <0.35 KU/L
RAGWEED, WESTERN IGE: <0.35 KU/L
RAGWEED, WESTERN, CLASS: NORMAL
ROACH IGE QN: <0.35 KU/L
TETANUS TOXOID IGG AB: POSITIVE
TETANUS TOXOID IGG VALUE: 1.85 IU/ML
TIMOTHY IGE QN: <0.35 KU/L
TOMATO IGE QN: <0.35 KU/L
WHITE OAK IGE QN: <0.35 KU/L

## 2018-06-27 LAB
DEPRECATED S PNEUM 1 IGG SER-MCNC: 2.2 MCG/ML
DEPRECATED S PNEUM12 IGG SER-MCNC: <0.3 MCG/ML
DEPRECATED S PNEUM14 IGG SER-MCNC: 10.8 MCG/ML
DEPRECATED S PNEUM19 IGG SER-MCNC: 0.9 MCG/ML
DEPRECATED S PNEUM23 IGG SER-MCNC: 0.3 MCG/ML
DEPRECATED S PNEUM3 IGG SER-MCNC: 1.3 MCG/ML
DEPRECATED S PNEUM4 IGG SER-MCNC: <0.3 MCG/ML
DEPRECATED S PNEUM5 IGG SER-MCNC: 4.1 MCG/ML
DEPRECATED S PNEUM8 IGG SER-MCNC: 2.2 MCG/ML
DEPRECATED S PNEUM9 IGG SER-MCNC: 0.9 MCG/ML
HAEM INFLU B IGG SER-MCNC: 0.14 MG/L
S PNEUM DA 18C IGG SER-MCNC: 2.5 MCG/ML
S PNEUM DA 6B IGG SER-MCNC: 0.4 MCG/ML
S PNEUM DA 7F IGG SER-MCNC: 3.2 MCG/ML
S PNEUM DA 9V IGG SER-MCNC: <0.3 MCG/ML

## 2018-07-16 ENCOUNTER — TELEPHONE (OUTPATIENT)
Dept: ALLERGY | Facility: CLINIC | Age: 23
End: 2018-07-16

## 2018-07-16 NOTE — TELEPHONE ENCOUNTER
Appointment schedule via the portal(7/27/18)----- Message from Lindsey Spence MA sent at 7/13/2018 10:06 AM CDT -----  Contact: Self/[phone]  Pt would like to speak with someone regarding her labs. Please advise.    Thanks

## 2018-09-07 ENCOUNTER — TELEPHONE (OUTPATIENT)
Dept: ALLERGY | Facility: CLINIC | Age: 23
End: 2018-09-07

## 2018-09-07 NOTE — TELEPHONE ENCOUNTER
----- Message from Meme Adams sent at 9/7/2018 12:02 PM CDT -----  Contact: self [phone]  Patient requesting a call from the office in regards to setting up her skin test appointment. Please call and advise, Thanks

## 2018-10-16 DIAGNOSIS — T78.2XXS ANAPHYLAXIS, SEQUELA: ICD-10-CM

## 2018-10-16 RX ORDER — EPINEPHRINE 0.3 MG/.3ML
INJECTION SUBCUTANEOUS
Qty: 2 EACH | Refills: 0 | Status: SHIPPED | OUTPATIENT
Start: 2018-10-16

## 2019-02-21 ENCOUNTER — OFFICE VISIT (OUTPATIENT)
Dept: OBSTETRICS AND GYNECOLOGY | Facility: CLINIC | Age: 24
End: 2019-02-21
Payer: MEDICAID

## 2019-02-21 VITALS
DIASTOLIC BLOOD PRESSURE: 64 MMHG | BODY MASS INDEX: 27.42 KG/M2 | WEIGHT: 136 LBS | HEIGHT: 59 IN | SYSTOLIC BLOOD PRESSURE: 126 MMHG

## 2019-02-21 DIAGNOSIS — Z01.419 WELL WOMAN EXAM WITH ROUTINE GYNECOLOGICAL EXAM: Primary | ICD-10-CM

## 2019-02-21 DIAGNOSIS — Z12.4 CERVICAL CANCER SCREENING: ICD-10-CM

## 2019-02-21 DIAGNOSIS — Z30.09 FAMILY PLANNING COUNSELING: ICD-10-CM

## 2019-02-21 PROCEDURE — 88175 CYTOPATH C/V AUTO FLUID REDO: CPT | Performed by: PATHOLOGY

## 2019-02-21 PROCEDURE — 99999 PR PBB SHADOW E&M-EST. PATIENT-LVL III: CPT | Mod: PBBFAC,,, | Performed by: OBSTETRICS & GYNECOLOGY

## 2019-02-21 PROCEDURE — 99385 PREV VISIT NEW AGE 18-39: CPT | Mod: S$PBB,,, | Performed by: OBSTETRICS & GYNECOLOGY

## 2019-02-21 PROCEDURE — 99213 OFFICE O/P EST LOW 20 MIN: CPT | Mod: PBBFAC | Performed by: OBSTETRICS & GYNECOLOGY

## 2019-02-21 PROCEDURE — 88141 CYTOPATH C/V INTERPRET: CPT | Mod: ,,, | Performed by: PATHOLOGY

## 2019-02-21 PROCEDURE — 99999 PR PBB SHADOW E&M-EST. PATIENT-LVL III: ICD-10-PCS | Mod: PBBFAC,,, | Performed by: OBSTETRICS & GYNECOLOGY

## 2019-02-21 PROCEDURE — 99385 PR PREVENTIVE VISIT,NEW,18-39: ICD-10-PCS | Mod: S$PBB,,, | Performed by: OBSTETRICS & GYNECOLOGY

## 2019-02-21 PROCEDURE — 88141 LIQUID-BASED PAP SMEAR, SCREENING: ICD-10-PCS | Mod: ,,, | Performed by: PATHOLOGY

## 2019-02-21 NOTE — PROGRESS NOTES
"[facility]  Ambulatory Clinic  Obstetrics & Gynecology    Visit Date:  2019    Chief Complaint:  Annual GYN exam    History of Present Illness:      Rigo Chi is a 24 y.o. , new pt to me, here for a gynecologic exam.    Pt here requesting Nexplanon removal.      Menses are light, not heavy or painful on Nexplanon placed 3 yrs ago per pt.    Pt "wants to take a break from hormonal contraception".    Pt denies family h/o adverse reaction to hormonal contraception.    Pt denies h/o abnormal pap, last pap ~.    Pt denies active sexually transmitted infections.    Pt performs monthly self breast examination, smokes tobacco, uses seat belts, and denies abuse.     Pt denies abnormal vaginal bleeding, vaginal discharge, dysmenorrhea, dyspareunia, pelvic pain, bloating, early satiety, unintentional weight loss, breast mass/skin changes, incontinence, GI or urinary complaints.      Otherwise, the pt is in her usual state of health and has good follow-up with her PCP.    Past History:  Gynecologic history as noted above.    Review of Systems:      GENERAL:  No fever, fatigue, excessive weight gain or loss  HEENT:  No headaches, hearing changes, visual disturbance  RESPIRATORY:  No cough, shortness of breath  CARDIOVASCULAR:  No chest pain, heart palpitations, leg swelling  BREAST:  No lump, pain, nipple discharge, skin changes  GASTROINTESTINAL:  No nausea, vomiting, constipation, diarrhea, abd pain, rectal bleeding   GENITOURINARY:  See HPI  ENDOCRINE:  No heat or cold intolerance  HEMATOLOGIC:  No easy bruisability or bleeding   LYMPHATICS:  No enlarged nodes  MUSCULOSKELETAL:  No joint pain or swelling  SKIN:  No rash, lesions, jaundice  NEUROLOGIC:  No dizziness, weakness, syncope  PSYCHIATRIC:  No depression, homicidal/suicidal ideations, anxiety or mood swings    Physical Exam:     /64   Ht 4' 11" (1.499 m)   Wt 61.7 kg (136 lb)   LMP 2019   BMI 27.47 " kg/m²   Pulse 70, Resp rate 16     GENERAL:  No acute distress, well-nourished  HEENT:  Atraumatic, anicteric, moist mucus membranes. Neck supple w/o masses.  BREAST:  Symmetric, nontender, no obvious masses, adenopathy, skin changes or nipple discharge.  LUNGS:  Clear to auscultation  HEART:  Regular rate and rhythm, no murmurs, gallops, or rubs  ABDOMEN:  Soft, non-tender, non-distended, normoactive bowel sounds, no obvious organomegaly  EXT:  Symmetric w/o cramping, claudication, or edema. +2 distal pulses.  Nexplanon in good position in left arm.  SKIN:  No rashes or bruising  PSYCH:  Mood and affect appropriate  NEURO:  Grossly intact bilaterally, FROM,  no sensory or motor deficits     GENITOURINARY:    VULVAR:  Female external genitalia w/o obvious lesions. Female hair distribution. Normal urethral meatus. No gross lymphadenopathy.    VAGINA:  Pink, moist, well-rugated. Good support. No obvious lesion. No discharge.  CERVIX:  No cervical motion tenderness, discharge, or obvious lesions.   UTERUS:  Small, non-tender, normal contour  ADNEXA:  No masses, non-tender    RECTAL:  Declined. No obvious external lesions    Chaperone present for exam.    Assessment:     24 y.o. :    1. Well woman gynecologic exam  2. Family planning - request Nexplanon removal    Plan:    A gynecologic health assessment was performed with age appropriate counseling.    Cervical cancer screening - pap obtained.    STI screening - pt declined.  Safe sex discussed.      Discussed contraceptive counseling.  Pt is requesting nexplanon removal and use condoms/natural family planning for now.    Encourage healthy lifestyle modifications, monthly self breast exams, encourage HPV vaccination, Ca/Vit D.    Encourage tobacco cessation, pt not ready to quit, declined help.    F/u with PCP for health maintenance.    Return 1 week for Nexplanon removal, or sooner as needed.  All questions answered, pt voiced understanding.        Toney CARRANZA  MD Amy

## 2019-11-15 ENCOUNTER — OFFICE VISIT (OUTPATIENT)
Dept: FAMILY MEDICINE | Facility: CLINIC | Age: 24
End: 2019-11-15
Payer: MEDICAID

## 2019-11-15 ENCOUNTER — TELEPHONE (OUTPATIENT)
Dept: FAMILY MEDICINE | Facility: CLINIC | Age: 24
End: 2019-11-15

## 2019-11-15 VITALS
TEMPERATURE: 98 F | BODY MASS INDEX: 28.58 KG/M2 | WEIGHT: 141.75 LBS | SYSTOLIC BLOOD PRESSURE: 120 MMHG | HEIGHT: 59 IN | OXYGEN SATURATION: 99 % | HEART RATE: 109 BPM | DIASTOLIC BLOOD PRESSURE: 86 MMHG

## 2019-11-15 DIAGNOSIS — H66.92 RECURRENT ACUTE OTITIS MEDIA OF LEFT EAR: ICD-10-CM

## 2019-11-15 DIAGNOSIS — H66.92 LEFT OTITIS MEDIA, UNSPECIFIED OTITIS MEDIA TYPE: ICD-10-CM

## 2019-11-15 DIAGNOSIS — Z23 NEEDS FLU SHOT: ICD-10-CM

## 2019-11-15 DIAGNOSIS — H60.502 ACUTE OTITIS EXTERNA OF LEFT EAR, UNSPECIFIED TYPE: Primary | ICD-10-CM

## 2019-11-15 PROCEDURE — 99213 OFFICE O/P EST LOW 20 MIN: CPT | Mod: PBBFAC,PO,25 | Performed by: INTERNAL MEDICINE

## 2019-11-15 PROCEDURE — 99214 OFFICE O/P EST MOD 30 MIN: CPT | Mod: S$PBB,,, | Performed by: INTERNAL MEDICINE

## 2019-11-15 PROCEDURE — 90471 IMMUNIZATION ADMIN: CPT | Mod: PBBFAC,PO

## 2019-11-15 PROCEDURE — 99999 PR PBB SHADOW E&M-EST. PATIENT-LVL III: CPT | Mod: PBBFAC,,, | Performed by: INTERNAL MEDICINE

## 2019-11-15 PROCEDURE — 99214 PR OFFICE/OUTPT VISIT, EST, LEVL IV, 30-39 MIN: ICD-10-PCS | Mod: S$PBB,,, | Performed by: INTERNAL MEDICINE

## 2019-11-15 PROCEDURE — 99999 PR PBB SHADOW E&M-EST. PATIENT-LVL III: ICD-10-PCS | Mod: PBBFAC,,, | Performed by: INTERNAL MEDICINE

## 2019-11-15 RX ORDER — AMOXICILLIN 875 MG/1
875 TABLET, FILM COATED ORAL EVERY 12 HOURS
Qty: 20 TABLET | Refills: 0 | Status: SHIPPED | OUTPATIENT
Start: 2019-11-15 | End: 2019-11-25

## 2019-11-15 RX ORDER — PHENTERMINE HYDROCHLORIDE 37.5 MG/1
37.5 TABLET ORAL EVERY MORNING
Refills: 0 | COMMUNITY
Start: 2019-08-30 | End: 2022-06-11

## 2019-11-15 RX ORDER — NEOMYCIN SULFATE, POLYMYXIN B SULFATE AND HYDROCORTISONE 10; 3.5; 1 MG/ML; MG/ML; [USP'U]/ML
3 SUSPENSION/ DROPS AURICULAR (OTIC) 3 TIMES DAILY
Qty: 10 ML | Refills: 0 | Status: SHIPPED | OUTPATIENT
Start: 2019-11-15 | End: 2019-11-22

## 2019-11-15 RX ORDER — CIPROFLOXACIN 0.5 MG/.25ML
4 SOLUTION/ DROPS AURICULAR (OTIC) 2 TIMES DAILY
Qty: 14 EACH | Refills: 0 | Status: SHIPPED | OUTPATIENT
Start: 2019-11-15 | End: 2019-11-15

## 2019-11-15 NOTE — PROGRESS NOTES
SUBJECTIVE     Chief Complaint   Patient presents with    Otalgia       HPI  Rigo Chi is a 24 y.o. female with multiple medical diagnoses as listed in the medical history and problem list that presents for evaluation of L otalgia x 2 weeks. Pt reports pain is aching at a 7/10 and intermittent in nature. She also reports yellow or whitish green drainage. Denies any fever, chills, or night sweats. Pt has been taking an OTC wax  without relief of symptoms. Denies any recent travel. +sick contacts(2 children with strep throat).    PAST MEDICAL HISTORY:  Past Medical History:   Diagnosis Date    Allergy     History of hepatitis C: S/p RX with SVR 12 documented in 2016       PAST SURGICAL HISTORY:  Past Surgical History:   Procedure Laterality Date     SECTION         SOCIAL HISTORY:  Social History     Socioeconomic History    Marital status: Single     Spouse name: Not on file    Number of children: Not on file    Years of education: Not on file    Highest education level: Not on file   Occupational History    Not on file   Social Needs    Financial resource strain: Not on file    Food insecurity:     Worry: Not on file     Inability: Not on file    Transportation needs:     Medical: Not on file     Non-medical: Not on file   Tobacco Use    Smoking status: Current Every Day Smoker     Types: Cigarettes, Vaping with nicotine    Smokeless tobacco: Never Used   Substance and Sexual Activity    Alcohol use: No    Drug use: No    Sexual activity: Yes     Partners: Male     Birth control/protection: Implant     Comment: implanon   Lifestyle    Physical activity:     Days per week: Not on file     Minutes per session: Not on file    Stress: Not on file   Relationships    Social connections:     Talks on phone: Not on file     Gets together: Not on file     Attends Mandaeism service: Not on file     Active member of club or organization: Not on file     Attends meetings  of clubs or organizations: Not on file     Relationship status: Not on file   Other Topics Concern    Not on file   Social History Narrative    11/20/2017    Living with a friend, cleaning with friends    Some legal troubles       FAMILY HISTORY:  Family History   Problem Relation Age of Onset    Allergies Mother     Allergies Father     Mental illness Son         autism    Allergies Son     Breast cancer Maternal Grandmother     Allergies Sister     Heart murmur Brother     Heart murmur Brother     Colon cancer Neg Hx     Ovarian cancer Neg Hx        ALLERGIES AND MEDICATIONS: updated and reviewed.  Review of patient's allergies indicates:  No Known Allergies  Current Outpatient Medications   Medication Sig Dispense Refill    EPINEPHrine (EPIPEN) 0.3 mg/0.3 mL AtIn INJECT INTO THE MUSCLE ONCE AS DIRECTED 2 each 0    etonogestrel (NEXPLANON) 68 mg Impl by Subdermal route.      phentermine (ADIPEX-P) 37.5 mg tablet Take 37.5 mg by mouth every morning.  0    amoxicillin (AMOXIL) 875 MG tablet Take 1 tablet (875 mg total) by mouth every 12 (twelve) hours. for 10 days 20 tablet 0    azelastine (ASTELIN) 137 mcg (0.1 %) nasal spray 1 spray (137 mcg total) by Nasal route 2 (two) times daily. 30 mL 0    cetirizine (ZYRTEC) 10 MG tablet Take 10 mg by mouth once daily.  0    ciprofloxacin HCl 0.2 % otic solution Place 4 drops into the left ear 2 (two) times daily. for 7 days 14 each 0    citalopram (CELEXA) 20 MG tablet Take 1 tablet by mouth every evening.       dextroamphetamine-amphetamine 10 mg Tab Take 1 tablet by mouth once daily.      fluticasone (FLONASE) 50 mcg/actuation nasal spray 2 sprays by Each Nare route once daily. (Patient not taking: Reported on 11/15/2019) 1 Bottle 1    mirtazapine (REMERON) 15 MG tablet Take 1 tablet by mouth nightly as needed.      mupirocin (BACTROBAN) 2 % ointment APPLY TO AFFECTED AREA TOPICALLY THREE TIMES DAILY AS NEEDED  0     No current facility-administered  "medications for this visit.        ROS  Review of Systems   Constitutional: Negative for chills and fever.   HENT: Positive for ear pain (left). Negative for hearing loss and sore throat.    Eyes: Negative for visual disturbance.   Respiratory: Negative for cough and shortness of breath.    Cardiovascular: Negative for chest pain, palpitations and leg swelling.   Gastrointestinal: Negative for abdominal pain, constipation, diarrhea, nausea and vomiting.   Genitourinary: Negative for dysuria, frequency and urgency.   Musculoskeletal: Negative for arthralgias, joint swelling and myalgias.   Skin: Negative for rash and wound.   Neurological: Negative for headaches.   Psychiatric/Behavioral: Negative for agitation and confusion. The patient is not nervous/anxious.          OBJECTIVE     Physical Exam  Vitals:    11/15/19 0915   BP: 120/86   Pulse: 109   Temp: 97.7 °F (36.5 °C)    Body mass index is 28.63 kg/m².  Weight: 64.3 kg (141 lb 12.1 oz)   Height: 4' 11" (149.9 cm)     Physical Exam   Constitutional: She is oriented to person, place, and time. She appears well-developed and well-nourished. No distress.   HENT:   Head: Normocephalic and atraumatic.   Right Ear: Hearing, tympanic membrane and external ear normal.   Left Ear: Hearing and external ear normal. There is drainage and swelling.   Nose: Nose normal. No rhinorrhea.   Mouth/Throat: Oropharynx is clear and moist. No uvula swelling. No posterior oropharyngeal edema or posterior oropharyngeal erythema.   Eyes: Conjunctivae and EOM are normal. Right eye exhibits no discharge. Left eye exhibits no discharge. No scleral icterus.   Neck: Normal range of motion. Neck supple. No JVD present. No tracheal deviation present.   Cardiovascular: Normal rate, regular rhythm and intact distal pulses. Exam reveals no gallop and no friction rub.   No murmur heard.  Pulmonary/Chest: Effort normal and breath sounds normal. No respiratory distress. She has no wheezes. "   Abdominal: Soft. Bowel sounds are normal. She exhibits no distension and no mass. There is no tenderness. There is no rebound and no guarding.   Musculoskeletal: Normal range of motion. She exhibits no edema, tenderness or deformity.   Neurological: She is alert and oriented to person, place, and time. She exhibits normal muscle tone. Coordination normal.   Skin: Skin is warm and dry. No rash noted. No erythema.   Psychiatric: She has a normal mood and affect. Her behavior is normal. Judgment and thought content normal.         Health Maintenance       Date Due Completion Date    Influenza Vaccine (1) 09/01/2019 2/16/2011    Pap Smear 02/21/2022 2/21/2019    Override on 1/12/2015: Done    TETANUS VACCINE 06/08/2026 6/8/2016            ASSESSMENT     24 y.o. female with     1. Acute otitis externa of left ear, unspecified type    2. Left otitis media, unspecified otitis media type    3. Recurrent acute otitis media of left ear    4. Needs flu shot        PLAN:     1. Acute otitis externa of left ear, unspecified type  - Pt advised to take Abx to completion  - ciprofloxacin HCl 0.2 % otic solution; Place 4 drops into the left ear 2 (two) times daily. for 7 days  Dispense: 14 each; Refill: 0    2. Left otitis media, unspecified otitis media type  - Pt advised to take Abx to completion  - amoxicillin (AMOXIL) 875 MG tablet; Take 1 tablet (875 mg total) by mouth every 12 (twelve) hours. for 10 days  Dispense: 20 tablet; Refill: 0    3. Recurrent acute otitis media of left ear  - Ambulatory referral to ENT    4. Needs flu shot  - Influenza - Quadrivalent (PF)  - Pt reports her brother had Guillian [place] at age 10, but she has never had any problems with flu shot with last one being about 5 years ago; pt advised that she may be at slightly increased risk to develop disease given family hx, but she reports she would prefer taking the chance and getting the shot as opposed to being sick with flu symptoms so proceeded with  injection      RTC in 1-2 weeks as needed for any acute worsening of current condition or failure to improve       Melany Coyne MD  11/15/2019 9:27 AM        No follow-ups on file.

## 2019-11-15 NOTE — TELEPHONE ENCOUNTER
----- Message from Angélica Mann sent at 11/15/2019 11:56 AM CST -----  Contact: ARMANDO THOMAS [[id number]]  Name of Who is Calling: ARMANDO THOMAS [[id number]]    What is the request in detail: Would like to inform provider that ciprofloxacin HCl 0.2 % otic solution is not covered by patient's insurance. Patient is requesting a new prescription for different ear drops. Please contact to further discuss and advise      Can the clinic reply by [facility]: no    [facility]: [phone]

## 2019-11-15 NOTE — PROGRESS NOTES
After obtaining consent, and per orders of Dr. Coyne, injection of flu shot given into the right deltoid by Mirna Hughes. Patient instructed to remain in clinic for 20 minutes afterwards, and to report any adverse reaction to me immediately.    Pt states that her brother with similar medical history has a hx of guillain barre as a child from a fall.  Clarified with  okay to give flu shot given family history,  advised pt she may be at increased risk for Conchita [place] syndrome and she does not have to get flu shot.  Pt elects to go ahead with flu shot.

## 2020-02-13 ENCOUNTER — CLINICAL SUPPORT (OUTPATIENT)
Dept: AUDIOLOGY | Facility: CLINIC | Age: 25
End: 2020-02-13
Payer: MEDICAID

## 2020-02-13 ENCOUNTER — OFFICE VISIT (OUTPATIENT)
Dept: OTOLARYNGOLOGY | Facility: CLINIC | Age: 25
End: 2020-02-13
Payer: MEDICAID

## 2020-02-13 VITALS
HEIGHT: 59 IN | SYSTOLIC BLOOD PRESSURE: 142 MMHG | DIASTOLIC BLOOD PRESSURE: 80 MMHG | WEIGHT: 141 LBS | BODY MASS INDEX: 28.43 KG/M2

## 2020-02-13 DIAGNOSIS — H93.293 ABNORMAL AUDITORY PERCEPTION, BILATERAL: Primary | ICD-10-CM

## 2020-02-13 DIAGNOSIS — H60.392 ACUTE INFECTIVE OTITIS EXTERNA, LEFT: Primary | ICD-10-CM

## 2020-02-13 DIAGNOSIS — H74.02 TYMPANOSCLEROSIS, LEFT: ICD-10-CM

## 2020-02-13 PROCEDURE — 92550 PR TYMPANOMETRY AND REFLEX THRESHOLD MEASUREMENTS: ICD-10-PCS | Mod: S$GLB,,, | Performed by: AUDIOLOGIST

## 2020-02-13 PROCEDURE — 99203 PR OFFICE/OUTPT VISIT, NEW, LEVL III, 30-44 MIN: ICD-10-PCS | Mod: S$GLB,,, | Performed by: OTOLARYNGOLOGY

## 2020-02-13 PROCEDURE — 92550 TYMPANOMETRY & REFLEX THRESH: CPT | Mod: S$GLB,,, | Performed by: AUDIOLOGIST

## 2020-02-13 PROCEDURE — 92557 PR COMPREHENSIVE HEARING TEST: ICD-10-PCS | Mod: S$GLB,,, | Performed by: AUDIOLOGIST

## 2020-02-13 PROCEDURE — 99203 OFFICE O/P NEW LOW 30 MIN: CPT | Mod: S$GLB,,, | Performed by: OTOLARYNGOLOGY

## 2020-02-13 PROCEDURE — 92557 COMPREHENSIVE HEARING TEST: CPT | Mod: S$GLB,,, | Performed by: AUDIOLOGIST

## 2020-02-13 NOTE — LETTER
February 13, 2020      Melany Coyne MD  [address] [address]  [address]  [place] [address]           [facility]  [address]   [place] [address]  Phone: [phone]          Patient: Rigo Chi   MR Number: [id number]   YOB: 1995   Date of Visit: 2/13/2020       Dear Dr. Melany Coyne:    Thank you for referring Rigo Chi to me for evaluation. Attached you will find relevant portions of my assessment and plan of care.    If you have questions, please do not hesitate to call me. I look forward to following Rigo Chi along with you.    Sincerely,    Jer Reinoso MD    Enclosure  CC:  No Recipients    If you would like to receive this communication electronically, please contact [email or web address] or [phone] to request more information on [employer] access.    For providers and/or their staff who would like to refer a patient to [facility], please contact us through our one-stop-shop provider referral line, [facility], at [phone].    If you feel you have received this communication in error or would no longer like to receive these types of communications, please e-mail [email or web address]

## 2020-02-13 NOTE — PROGRESS NOTES
Click on link to view Audiogram  Document on 2/13/2020  2:27 PM by Mirna Braun MA CCC-A: Audiogram    Rigo Chi was seen today for an Audiologic evaluation for hearing loss and recurrent ear infections.    Tympanometry revealed a Type A tympanogram bilaterally.  The Audiogram revealed hearing within normal limits bilaterally.    Recommendations:  1. Otologic evaluation  2. Audiogram as needed

## 2020-02-13 NOTE — PROGRESS NOTES
Subjective:       Patient ID: Rigo Chi is a 25 y.o. female.    Chief Complaint: Hearing Loss and Otalgia    HPI     Rigo Chi is a 25 y.o. female presents for evaluation of left ear infection.  She states she was treated by [facility] in November for ear drainage and swelling of her ear canal. She reports frequent Q-tip use.  She states things have returned to normal after using ear drops.  She denies any hearing loss.  She states she had a history of ear infections growing up.    Review of Systems   Constitutional: Negative for chills, fever and unexpected weight change.   HENT: Negative for sore throat and trouble swallowing.    Eyes: Negative for pain and visual disturbance.   Respiratory: Negative for apnea and shortness of breath.    Cardiovascular: Negative for chest pain and palpitations.   Gastrointestinal: Negative for abdominal pain and nausea.   Endocrine: Negative for cold intolerance and heat intolerance.   Musculoskeletal: Negative for joint swelling and neck stiffness.   Skin: Negative for color change and rash.   Neurological: Negative for facial asymmetry and headaches.   Hematological: Negative for adenopathy. Does not bruise/bleed easily.   Psychiatric/Behavioral: Negative for agitation. The patient is not nervous/anxious.        Objective:      Physical Exam   Constitutional: She is oriented to person, place, and time. She appears well-developed and well-nourished. No distress.   HENT:   Head: Normocephalic and atraumatic.   Right Ear: Tympanic membrane, external ear and ear canal normal.   Left Ear: External ear and ear canal normal. Tympanic membrane is scarred (mild myringosclerosis).   Nose: Nose normal.   Mouth/Throat: Uvula is midline, oropharynx is clear and moist and mucous membranes are normal.   Lin: Midline  Rinne: AC > BC @ 512Hz   Eyes: Pupils are equal, round, and reactive to light. Conjunctivae and EOM are normal.   Neck: Normal range of motion. No  tracheal deviation present. No thyromegaly present.   Cardiovascular: Normal rate and regular rhythm.   Pulmonary/Chest: Effort normal. No respiratory distress.   Musculoskeletal: Normal range of motion.   Lymphadenopathy:        Head (right side): No submental, no submandibular and no tonsillar adenopathy present.        Head (left side): No submental, no submandibular and no tonsillar adenopathy present.     She has no cervical adenopathy.   Neurological: She is alert and oriented to person, place, and time.   Psychiatric: She has a normal mood and affect. Her behavior is normal.   Nursing note and vitals reviewed.        Audiogram tracings independently reviewed and discussed with patient shows normal thresholds, WRS, tympanograms, and reflexes AU      Assessment:       1. Acute infective otitis externa, left    2. Tympanosclerosis, left        Plan:    audio normal   hx consistent with OE   instructed on proper ear hygiene   mild myringosclerosis noted on left TM   f/u PRN

## 2021-01-05 ENCOUNTER — PATIENT MESSAGE (OUTPATIENT)
Dept: ADMINISTRATIVE | Facility: HOSPITAL | Age: 26
End: 2021-01-05

## 2021-01-19 ENCOUNTER — TELEPHONE (OUTPATIENT)
Dept: FAMILY MEDICINE | Facility: CLINIC | Age: 26
End: 2021-01-19

## 2021-08-17 ENCOUNTER — HOSPITAL ENCOUNTER (EMERGENCY)
Facility: HOSPITAL | Age: 26
Discharge: HOME | End: 2021-08-18
Attending: EMERGENCY MEDICINE
Payer: MEDICAID

## 2021-08-17 DIAGNOSIS — K04.7 DENTAL ABSCESS: Primary | ICD-10-CM

## 2021-08-17 PROCEDURE — 25000003 PHARM REV CODE 250: Performed by: PHYSICIAN ASSISTANT

## 2021-08-17 PROCEDURE — 41800 DRAINAGE OF GUM LESION: CPT

## 2021-08-17 PROCEDURE — 99284 EMERGENCY DEPT VISIT MOD MDM: CPT | Mod: 25

## 2021-08-17 RX ORDER — OXYCODONE AND ACETAMINOPHEN 5; 325 MG/1; MG/1
1 TABLET ORAL
Status: COMPLETED | OUTPATIENT
Start: 2021-08-17 | End: 2021-08-17

## 2021-08-17 RX ORDER — BUPIVACAINE HCL/EPINEPHRINE 0.5-1:200K
3.6 VIAL (ML) INJECTION
Status: COMPLETED | OUTPATIENT
Start: 2021-08-17 | End: 2021-08-18

## 2021-08-17 RX ORDER — PENICILLIN V POTASSIUM 250 MG/1
500 TABLET, FILM COATED ORAL
Status: COMPLETED | OUTPATIENT
Start: 2021-08-17 | End: 2021-08-17

## 2021-08-17 RX ADMIN — OXYCODONE HYDROCHLORIDE AND ACETAMINOPHEN 1 TABLET: 5; 325 TABLET ORAL at 11:08

## 2021-08-17 RX ADMIN — PENICILLIN V POTASIUM 500 MG: 250 TABLET ORAL at 11:08

## 2021-08-18 VITALS
OXYGEN SATURATION: 98 % | BODY MASS INDEX: 31.85 KG/M2 | SYSTOLIC BLOOD PRESSURE: 139 MMHG | HEART RATE: 90 BPM | DIASTOLIC BLOOD PRESSURE: 81 MMHG | WEIGHT: 158 LBS | TEMPERATURE: 98 F | HEIGHT: 59 IN | RESPIRATION RATE: 18 BRPM

## 2021-08-18 PROCEDURE — 25000003 PHARM REV CODE 250: Performed by: PHYSICIAN ASSISTANT

## 2021-08-18 PROCEDURE — 41800 DRAINAGE OF GUM LESION: CPT

## 2021-08-18 RX ORDER — CHLORHEXIDINE GLUCONATE ORAL RINSE 1.2 MG/ML
15 SOLUTION DENTAL 2 TIMES DAILY
Qty: 118 ML | Refills: 0 | Status: SHIPPED | OUTPATIENT
Start: 2021-08-18 | End: 2021-09-01

## 2021-08-18 RX ORDER — OXYCODONE AND ACETAMINOPHEN 5; 325 MG/1; MG/1
1 TABLET ORAL EVERY 4 HOURS PRN
Qty: 6 TABLET | Refills: 0 | OUTPATIENT
Start: 2021-08-18 | End: 2022-06-11

## 2021-08-18 RX ORDER — PENICILLIN V POTASSIUM 500 MG/1
500 TABLET, FILM COATED ORAL 4 TIMES DAILY
Qty: 40 TABLET | Refills: 0 | Status: SHIPPED | OUTPATIENT
Start: 2021-08-18 | End: 2021-08-28

## 2021-08-18 RX ADMIN — BUPIVACAINE HYDROCHLORIDE AND EPINEPHRINE BITARTRATE 3.6 ML: 5; .005 INJECTION, SOLUTION PERINEURAL at 12:08

## 2022-06-11 ENCOUNTER — HOSPITAL ENCOUNTER (EMERGENCY)
Facility: HOSPITAL | Age: 27
Discharge: HOME | End: 2022-06-11
Attending: EMERGENCY MEDICINE
Payer: MEDICAID

## 2022-06-11 VITALS
BODY MASS INDEX: 29.84 KG/M2 | RESPIRATION RATE: 20 BRPM | HEIGHT: 59 IN | DIASTOLIC BLOOD PRESSURE: 77 MMHG | WEIGHT: 148 LBS | OXYGEN SATURATION: 98 % | TEMPERATURE: 98 F | SYSTOLIC BLOOD PRESSURE: 130 MMHG | HEART RATE: 89 BPM

## 2022-06-11 DIAGNOSIS — R22.0 FACIAL SWELLING: ICD-10-CM

## 2022-06-11 DIAGNOSIS — K04.7 DENTAL ABSCESS: Primary | ICD-10-CM

## 2022-06-11 LAB
B-HCG UR QL: NEGATIVE
CTP QC/QA: YES

## 2022-06-11 PROCEDURE — 25000003 PHARM REV CODE 250: Performed by: NURSE PRACTITIONER

## 2022-06-11 PROCEDURE — 99284 EMERGENCY DEPT VISIT MOD MDM: CPT

## 2022-06-11 PROCEDURE — 81025 URINE PREGNANCY TEST: CPT | Performed by: EMERGENCY MEDICINE

## 2022-06-11 RX ORDER — AMOXICILLIN AND CLAVULANATE POTASSIUM 875; 125 MG/1; MG/1
1 TABLET, FILM COATED ORAL
Status: COMPLETED | OUTPATIENT
Start: 2022-06-11 | End: 2022-06-11

## 2022-06-11 RX ORDER — HYDROCODONE BITARTRATE AND ACETAMINOPHEN 5; 325 MG/1; MG/1
1 TABLET ORAL
Status: COMPLETED | OUTPATIENT
Start: 2022-06-11 | End: 2022-06-11

## 2022-06-11 RX ORDER — LIDOCAINE HYDROCHLORIDE 20 MG/ML
5 SOLUTION OROPHARYNGEAL
Status: COMPLETED | OUTPATIENT
Start: 2022-06-11 | End: 2022-06-11

## 2022-06-11 RX ORDER — AMOXICILLIN AND CLAVULANATE POTASSIUM 875; 125 MG/1; MG/1
1 TABLET, FILM COATED ORAL 2 TIMES DAILY
Qty: 20 TABLET | Refills: 0 | Status: SHIPPED | OUTPATIENT
Start: 2022-06-11 | End: 2022-06-21

## 2022-06-11 RX ORDER — CHLORHEXIDINE GLUCONATE ORAL RINSE 1.2 MG/ML
15 SOLUTION DENTAL 2 TIMES DAILY
Qty: 210 ML | Refills: 0 | Status: SHIPPED | OUTPATIENT
Start: 2022-06-11 | End: 2022-06-18

## 2022-06-11 RX ORDER — HYDROCODONE BITARTRATE AND ACETAMINOPHEN 5; 325 MG/1; MG/1
1 TABLET ORAL EVERY 4 HOURS PRN
Qty: 18 TABLET | Refills: 0 | Status: SHIPPED | OUTPATIENT
Start: 2022-06-11 | End: 2022-06-14

## 2022-06-11 RX ORDER — LIDOCAINE HYDROCHLORIDE 20 MG/ML
SOLUTION OROPHARYNGEAL 4 TIMES DAILY PRN
Qty: 100 ML | Refills: 0 | Status: SHIPPED | OUTPATIENT
Start: 2022-06-11 | End: 2022-06-16

## 2022-06-11 RX ADMIN — AMOXICILLIN AND CLAVULANATE POTASSIUM 1 TABLET: 875; 125 TABLET, FILM COATED ORAL at 07:06

## 2022-06-11 RX ADMIN — HYDROCODONE BITARTRATE AND ACETAMINOPHEN 1 TABLET: 5; 325 TABLET ORAL at 07:06

## 2022-06-11 RX ADMIN — LIDOCAINE HYDROCHLORIDE 5 ML: 20 SOLUTION ORAL; TOPICAL at 07:06

## 2022-06-11 NOTE — ED TRIAGE NOTES
Pt woke up with left facial swelling, feels warm to touch and painful.   Pt is not aware of any dental problems.

## 2022-06-11 NOTE — DISCHARGE INSTRUCTIONS
You were seen and evaluated in the ER today.  We are going to treat you for your dental abscess.  Please take medications as prescribed.  Use mouthwash is to prevent infection and help with pain.  Please follow-up with dentist for further evaluation.  Please return to the ED for any worsening symptoms such as chest pain, shortness of breath, fever not controlled with Tylenol or ibuprofen or uncontrolled pain.      Our goal in the emergency department is to always give you outstanding care and exceptional service. You may receive a survey by mail or e-mail in the next week regarding your experience in our ED. We would greatly appreciate your completing and returning the survey. Your feedback provides us with a way to recognize our staff who give very good care and it helps us learn how to improve when your experience was below our aspiration of excellence.

## 2022-06-11 NOTE — ED PROVIDER NOTES
"Source of History:  Patient, chart    Chief complaint:  Facial Swelling (Left side facial swelling, warm to touch)      HPI:  Rigo Chi is a 27 y.o. female presenting with left-sided lower facial swelling that she woke up with this morning.  Patient denies any dental pain.  Patient denies taking anything for symptoms.  Patient denies any difficulty breathing, swallowing or handling secretions.  No stridor noted.    This is the extent to the patients complaints today here in the emergency department.    ROS: As per HPI and below:  Constitutional: No fever.  No chills.  Eyes: No visual changes.  ENT:  Positive for left-sided facial swelling.  Positive for dental caries  Cardiovascular: no chest pain  Neurologic: No headache. No focal weakness.  No numbness.  MSK: no back pain.  Integument: No rashes or lesions.    Review of patient's allergies indicates:  No Known Allergies    PMH:  As per HPI and below:  Past Medical History:   Diagnosis Date    Allergy     History of hepatitis C: S/p RX with SVR 12 documented in 2016     Past Surgical History:   Procedure Laterality Date     SECTION         Social History     Tobacco Use    Smoking status: Current Every Day Smoker     Types: Cigarettes, Vaping with nicotine    Smokeless tobacco: Never Used   Substance Use Topics    Alcohol use: No    Drug use: No       Physical Exam:    /71 (BP Location: Left arm)   Pulse 84   Temp 98.6 °F (37 °C) (Oral)   Resp 20   Ht 4' 11" (1.499 m)   Wt 67.1 kg (148 lb)   SpO2 97%   Breastfeeding No   BMI 29.89 kg/m²   Nursing note and vital signs reviewed.  Constitutional: No acute distress.  Nontoxic  Eyes: No conjunctival injection.  Extraocular muscles are intact.  ENT:  Left-sided facial swelling noted on exam.  No trismus.  Uvula midline.  There is focal tooth tenderness.  Patient able to brace tongue to refer mouth.    Musculoskeletal: Good range of motion all joints.  No deformities.  " Neck supple.  No meningismus.  Skin: No rashes seen.  No abrasions.  No ecchymoses.  Neuro: alert and oriented x3,    Psych: Appropriate, conversant    Labs that have been ordered have been independently reviewed and interpreted by myself.    I decided to obtain the patient's medical records.  Summary of Medical Records:  History of dental abscess.    MDM:    27 y.o. female who appears to have dental abscess and facial swelling.  Based upon the history and physical I see no signs of Kashmir's angina, sublingual swelling, facial swelling, airway compromise, facial cellulitis, sepsis, dehydration, or a fluctuant abscess to drain.  I believe the patient can be discharged home with antibiotics, anti-inflammatories, and pain medications.  The patient has been given specific return precautions and instructed to follow up with a dentist.     Patient discharged home in stable condition. Diagnosis and treatment plan explained to patient and/or family member who is at bedside. I have answered all questions and the patient is satisfied with the plan of care. Strict return precautions given. The patient demonstrates understanding of the care plan.                 Diagnostic Impression:    1. Dental abscess    2. Facial swelling         ED Disposition Condition    Discharge Stable          ED Prescriptions     Medication Sig Dispense Start Date End Date Auth. Provider    amoxicillin-clavulanate 875-125mg (AUGMENTIN) 875-125 mg per tablet Take 1 tablet by mouth 2 (two) times daily. for 10 days 20 tablet 6/11/2022 6/21/2022 Ciara Quinonez NP    chlorhexidine (PERIDEX) 0.12 % solution Use as directed 15 mLs in the mouth or throat 2 (two) times daily. for 7 days 210 mL 6/11/2022 6/18/2022 Ciara Quinonez NP    LIDOcaine HCl 2% (XYLOCAINE) 2 % Soln by Mucous Membrane route 4 (four) times daily as needed (mouth pain). Swish and spit 5 ml every 6 hours as needed for pain. 100 mL 6/11/2022 6/16/2022 Ciara Quinonez NP     HYDROcodone-acetaminophen (NORCO) 5-325 mg per tablet Take 1 tablet by mouth every 4 (four) hours as needed for Pain. 18 tablet 6/11/2022 6/14/2022 Ciara Quinonez NP        Follow-up Information     Follow up With Specialties Details Why Contact Info    Reymundo Arndt MD Family Medicine Schedule an appointment as soon as possible for a visit  As needed [id number] [facility]  [address] [place] [address]  [phone]               Ciara Quinonez NP  06/11/22 [id number]